# Patient Record
(demographics unavailable — no encounter records)

---

## 2024-11-07 NOTE — LETTER BODY
[Dear  ___] : Dear  [unfilled], [Courtesy Letter:] : I had the pleasure of seeing your patient, [unfilled], in my office today. [Please see my note below.] : Please see my note below. [Sincerely,] : Sincerely, [FreeTextEntry2] : Alejandro Monae MD [address] [facility] [address] [place] [address]

## 2024-11-07 NOTE — ASSESSMENT
[FreeTextEntry1] : His estrogen is a touch high.  Will start him on anastrozole half tab twice a week.  All usage, dosage, mechanism of action, adverse events fully reviewed.  He understands there is an off label use.  Concerning his difficulty reaching orgasm, will see if Arimidex helps if not, will recommend neurology for further evaluation.

## 2024-11-07 NOTE — HISTORY OF PRESENT ILLNESS
[Currently Experiencing ___] :  [unfilled] [Erectile Dysfunction] : Erectile Dysfunction [Fatigue] : fatigue [FreeTextEntry1] : Duane is a 49-year-old male born November 15, 1974, who we have been following for low testosterone.  Currently, he is managed on Xyosted 75 mg q. weekly.  Reports good efficacy and denies adverse events.  He presents today to review his blood work.  Additionally, he takes sildenafil and reports good erections with this medication.  He is having a difficult time reaching orgasm.  This started roughly in September. He cannot think of any precipitating factors.  Peak values from 10/20/2024: Total testosterone 775 Free 84 Bioavailable 176.4 SHBG 46 Total bilirubin 1.9 and elevated Direct 0.4 elevated Indirect 1.5 and elevated CBC demonstrates no evidence of anemia Estradiol 41 and borderline Prolactin 7.2

## 2024-11-07 NOTE — LETTER HEADER
[FreeTextEntry3] : Earl Menezes MD [facility] [facility], [address] [place] [address]

## 2024-11-07 NOTE — LETTER HEADER
[FreeTextEntry3] : Earl Menezes MD [facility] [facility], [address] [place] [address]

## 2024-11-07 NOTE — LETTER BODY
[Dear  ___] : Dear  [unfilled], [Courtesy Letter:] : I had the pleasure of seeing your patient, [unfilled], in my office today. [Please see my note below.] : Please see my note below. [Sincerely,] : Sincerely, [FreeTextEntry2] : Alejandro Monae MD [address] [facility] [address] [place] [address]

## 2024-11-07 NOTE — PHYSICAL EXAM
[General Appearance - Well Developed] : well developed [General Appearance - Well Nourished] : well nourished [Normal Appearance] : normal appearance [Well Groomed] : well groomed [General Appearance - In No Acute Distress] : no acute distress [Respiration, Rhythm And Depth] : normal respiratory rhythm and effort [Exaggerated Use Of Accessory Muscles For Inspiration] : no accessory muscle use [Normal Station and Gait] : the gait and station were normal for the patient's age [] : no rash [No Focal Deficits] : no focal deficits [Oriented To Time, Place, And Person] : oriented to person, place, and time [Affect] : the affect was normal [Mood] : the mood was normal [Not Anxious] : not anxious [Edema] : no peripheral edema [Chaperone Declined] : Patient declined chaperone

## 2024-12-12 NOTE — HISTORY OF PRESENT ILLNESS
[Currently Experiencing ___] :  [unfilled] [FreeTextEntry1] : Dejan is a 50-year-old male born November 15, 1974, who we have been following for low testosterone.  Currently, he is managed on Xyosted 75 mg q. weekly.  Reports good efficacy and denies adverse events.  He presents today to review his blood work.  Additionally, he takes sildenafil and reports good erections with this medication.  He is still having a difficult time reaching orgasm.  This started roughly in September. He cannot think of any precipitating factors.  His estrogen was a touch high. We started him on anastrozole half tab twice a week. All usage, dosage, mechanism of action, adverse events fully reviewed. He understands there is an off label use. Concerning his difficulty reaching orgasm, will see if Arimidex helps if not, will recommend neurology for further evaluation.  He tells me since last seen he is only reach orgasm once he can get a good erection he Penis vaginal contact he can do the thrusting motion as long as his partner wants he just cannot pop.  Even if she will play with his penis after she is reached orgasm outside of the vagina no matter what she does and they have been together while she cannot bring him to orgasm.  He consents the penis being touched he finds it pleasurable but he cannot reach orgasm.  The blood test needs December 4, 2024 with him on Xyosted 75 mg once a week and anastrozole half a tablet twice per week showed Liver function test had a slightly elevated bilirubin at 1.5 and 0.3 which is something he has had before with the numbers in October being higher at 1.9 and 0.4 and then numbers in August being about the same at 1.3 and 0.3.  He tells me he spoke with Dr. Fajardo is not concerned. Hemoglobin was 15.1 with a platelet count of 216,000 Total testosterone was 653 They did not run the bioavailable even though he told them they were drawing enough blood Sex hormone binding globulin was 37 and similar if not a little bit lower than October.  For erectile dysfunction he is using sildenafil 100 mg and not having any trouble  He has not had hematospermia but that again he has only had orgasm once

## 2024-12-12 NOTE — ADDENDUM
[FreeTextEntry1] :  Longitudinal care (CPT code ): - The patient is diagnosed with hypogonadism , which is a chronic condition due to lifestyle choices or metabolic dysregulation, and requires longitudinal care to prevent disease progression and systemic complications   Longitudinal care (CPT code ): - The patient is diagnosed with erectile dysfunction , which is a chronic condition due to lifestyle choices or metabolic dysregulation, and requires longitudinal care to prevent disease progression and systemic complications.  Please note the only allow 30 days at a time

## 2024-12-12 NOTE — PHYSICAL EXAM
[General Appearance - Well Developed] : well developed [General Appearance - Well Nourished] : well nourished [Normal Appearance] : normal appearance [Well Groomed] : well groomed [General Appearance - In No Acute Distress] : no acute distress [Heart Rate And Rhythm] : heart rate and rhythm were normal [Edema] : no peripheral edema [Respiration, Rhythm And Depth] : normal respiratory rhythm and effort [Exaggerated Use Of Accessory Muscles For Inspiration] : no accessory muscle use [Auscultation Breath Sounds / Voice Sounds] : lungs were clear to auscultation bilaterally [Abdomen Soft] : soft [Abdomen Tenderness] : non-tender [Costovertebral Angle Tenderness] : no ~M costovertebral angle tenderness [Normal Station and Gait] : the gait and station were normal for the patient's age [] : no rash [No Focal Deficits] : no focal deficits [Oriented To Time, Place, And Person] : oriented to person, place, and time [Affect] : the affect was normal [Mood] : the mood was normal [Not Anxious] : not anxious [FreeTextEntry1] : 29.85 [de-identified] : Fields of View were normal

## 2024-12-12 NOTE — REVIEW OF SYSTEMS
[see HPI] : see HPI [Erectile Dysfunction] : erectile dysfunction [Negative] : Heme/Lymph Cr elevated to 1.5 from baseline 1.0.  - will hold Entresto and aldactone for now and restart as able   - Per cardiology recommends, given soft BP wants to try losartan 25mg to see if patient blood pressure can tolerate. Able to tolerate losartan 25mg, will restart home entresto on 12/29    - FeNA consistent w/ pre-renal etiology   - Now resolved s/p Gentle hydration  - Avoid nephrotoxins  -Renally dose meds

## 2024-12-12 NOTE — LETTER BODY
[Dear  ___] : Dear  [unfilled], [Courtesy Letter:] : I had the pleasure of seeing your patient, [unfilled], in my office today. [Please see my note below.] : Please see my note below. [Sincerely,] : Sincerely, [FreeTextEntry2] : Alejandro Monae MD [address] [facility] [address] [place] [address]

## 2024-12-12 NOTE — ASSESSMENT
[FreeTextEntry1] : I do not have a bioavailable but his numbers are pretty similar to the last times some I can repeat them now but I did suggest he contact [facility] and find out why they are not working the way they should especially when he told them they were not drawing in of tubes.  I will continue him on the Xyosted and Arimidex and I am recommending he see a neurologist.  Come up with some reason why he is not able to reach orgasm  We will get blood in 2-1/2 see me in 3 months.  He is aware that I will be retiring the end of April I will still be able to see him 1 more time at that point if he is willing we will transfer his care to Dr. Francois the specialist the [facility] has hired to take over my practice in this area or he would have to seek help in a different office

## 2024-12-12 NOTE — LETTER HEADER
[FreeTextEntry3] : Earl Menezes MD [facility] [facility], [address] [place] [address]

## 2025-03-12 NOTE — LETTER BODY
[Dear  ___] : Dear  [unfilled], [Courtesy Letter:] : I had the pleasure of seeing your patient, [unfilled], in my office today. [Please see my note below.] : Please see my note below. [Sincerely,] : Sincerely, [FreeTextEntry2] : Alejandro Monae MD [address] [facility] [address] [place] [address]

## 2025-03-12 NOTE — ADDENDUM
Spoke to pt she made appt with Dr. Álvarez Aug. 5th and she has appt with the other md on Aug. 7th   [FreeTextEntry1] :  Longitudinal care (CPT code ): - The patient is diagnosed with hypogonadism , which is a chronic condition due to lifestyle choices or metabolic dysregulation, and requires longitudinal care to prevent disease progression and systemic complications   Longitudinal care (CPT code ): - The patient is diagnosed with erectile dysfunction , which is a chronic condition due to lifestyle choices or metabolic dysregulation, and requires longitudinal care to prevent disease progression and systemic complications.  Please note the insurance only allows 30 days of xyosted at a time

## 2025-03-12 NOTE — PHYSICAL EXAM
[General Appearance - Well Developed] : well developed [General Appearance - Well Nourished] : well nourished [Normal Appearance] : normal appearance [Well Groomed] : well groomed [General Appearance - In No Acute Distress] : no acute distress [Heart Rate And Rhythm] : heart rate and rhythm were normal [Edema] : no peripheral edema [Respiration, Rhythm And Depth] : normal respiratory rhythm and effort [Exaggerated Use Of Accessory Muscles For Inspiration] : no accessory muscle use [Auscultation Breath Sounds / Voice Sounds] : lungs were clear to auscultation bilaterally [Abdomen Soft] : soft [Abdomen Tenderness] : non-tender [Costovertebral Angle Tenderness] : no ~M costovertebral angle tenderness [Normal Station and Gait] : the gait and station were normal for the patient's age [] : no rash [No Focal Deficits] : no focal deficits [Oriented To Time, Place, And Person] : oriented to person, place, and time [Mood] : the mood was normal [Affect] : the affect was normal [Not Anxious] : not anxious [FreeTextEntry1] : 29.85 [de-identified] : Fields of View were normal

## 2025-03-12 NOTE — HISTORY OF PRESENT ILLNESS
[FreeTextEntry1] : Dejan is a 50-year-old male born November 15, 1974, who we have been following for low testosterone. He was last seen 12/12/2024  Currently, he is managed on Xyosted 75 mg q. weekly.  Reports good efficacy and denies adverse events.  He presents today to review his blood work.  Additionally, he takes sildenafil and reports good erections with this medication.  He is still having a difficult time reaching orgasm.  This started roughly in September. He cannot think of any precipitating factors.  At the last visit we reviewed that his estrogen was a touch high. We have him back on anastrozole half tab twice a week for about 6 months. He understands there is an off label use. Concerning his difficulty reaching orgasm, continued use of the Arimidex has not helped, and he has an appointment April 30 with neurology for further evaluation.  He was toget blood in 2-1/2 see me in 3 months. He is aware that I will be retiring the end of April I will still be able to see him 1 more time at that point if he is willing we will transfer his care to Dr. Francois the specialist the [facility] has hired to take over my practice in this area or he would have to seek help in a different office.  Blood test were done with him on Xyosted 75 mg injection once per week with anastrozole half a milligram twice per week and they were done February 27, 2025 Total testosterone was 953 Free testosterone was 116 Bioavailable testosterone was 243 Estradiol was undetectable please note his BMI is fairly stable at 30.42 Sex hormone binding globulin was 43 with albumin of 4.6 Liver function test showed elevated direct bilirubin at 0.3 (</= 0.2) with maximal indirect bilirubin of 1.2 Hemoglobin was 14.7 with a platelet count of 228,000

## 2025-03-12 NOTE — LETTER HEADER
[FreeTextEntry3] : Earl Menezes MD [facility] [facility], [address] [place] [address]